# Patient Record
Sex: FEMALE | Race: OTHER
[De-identification: names, ages, dates, MRNs, and addresses within clinical notes are randomized per-mention and may not be internally consistent; named-entity substitution may affect disease eponyms.]

---

## 2019-02-13 ENCOUNTER — HOSPITAL ENCOUNTER (OUTPATIENT)
Dept: HOSPITAL 88 - OR | Age: 24
Discharge: HOME | End: 2019-02-13
Attending: OBSTETRICS & GYNECOLOGY
Payer: COMMERCIAL

## 2019-02-13 VITALS — DIASTOLIC BLOOD PRESSURE: 72 MMHG | SYSTOLIC BLOOD PRESSURE: 116 MMHG

## 2019-02-13 DIAGNOSIS — O03.4: Primary | ICD-10-CM

## 2019-02-13 LAB
BASOPHILS # BLD AUTO: 0.1 10*3/UL (ref 0–0.1)
BASOPHILS NFR BLD AUTO: 0.7 % (ref 0–1)
DEPRECATED NEUTROPHILS # BLD AUTO: 7.9 10*3/UL (ref 2.1–6.9)
EOSINOPHIL # BLD AUTO: 0.2 10*3/UL (ref 0–0.4)
EOSINOPHIL NFR BLD AUTO: 1.8 % (ref 0–6)
ERYTHROCYTE [DISTWIDTH] IN CORD BLOOD: 12.9 % (ref 11.7–14.4)
HCT VFR BLD AUTO: 36.7 % (ref 34.2–44.1)
HGB BLD-MCNC: 12.7 G/DL (ref 12–16)
LYMPHOCYTES # BLD: 2.9 10*3/UL (ref 1–3.2)
LYMPHOCYTES NFR BLD AUTO: 24.6 % (ref 18–39.1)
MCH RBC QN AUTO: 31.2 PG (ref 28–32)
MCHC RBC AUTO-ENTMCNC: 34.6 G/DL (ref 31–35)
MCV RBC AUTO: 90.2 FL (ref 81–99)
MONOCYTES # BLD AUTO: 0.8 10*3/UL (ref 0.2–0.8)
MONOCYTES NFR BLD AUTO: 6.9 % (ref 4.4–11.3)
NEUTS SEG NFR BLD AUTO: 65.7 % (ref 38.7–80)
PLATELET # BLD AUTO: 244 X10E3/UL (ref 140–360)
RBC # BLD AUTO: 4.07 X10E6/UL (ref 3.6–5.1)

## 2019-02-13 PROCEDURE — 85025 COMPLETE CBC W/AUTO DIFF WBC: CPT

## 2019-02-13 PROCEDURE — 36415 COLL VENOUS BLD VENIPUNCTURE: CPT

## 2019-02-13 PROCEDURE — 84702 CHORIONIC GONADOTROPIN TEST: CPT

## 2019-02-13 PROCEDURE — 59812 TREATMENT OF MISCARRIAGE: CPT

## 2019-02-13 PROCEDURE — 88305 TISSUE EXAM BY PATHOLOGIST: CPT

## 2019-02-13 NOTE — OPERATIVE REPORT
DATE OF PROCEDURE:  2019 

 

PREOPERATIVE DIAGNOSIS:  Incomplete .  



POSTOPERATIVE DIAGNOSIS:  Incomplete .



PROCEDURE:  Suction evacuation of retained products of conception.



COMPLICATIONS:  None.



ESTIMATED BLOOD LOSS:  50 mL. 



The patient was taken to the OR.  General anesthesia was placed.  Patient 

was prepped and draped in the normal sterile fashion.  Placed in the dorsal 

lithotomy position.  Uterus was anteverted and mobile.  No adnexal masses.  

Cervix was opened.  Retained products of conception and clots were coming 

out of the cervix, which were removed with the ring forceps.  Suction 

curette was passed through the uterine cavity.  Vacuum was created.  

Suction evacuation of retained products of conception was performed.  Sharp 

curettings were obtained and suction curette was reintroduced with removal 

of debris.  The patient tolerated the procedure well.  Lap, sponge and 

needle count was correct times 2 at the end of the procedure. 









DD:  2019 09:32

DT:  2019 09:51

Job#:  [id number]